# Patient Record
Sex: FEMALE | Race: OTHER | HISPANIC OR LATINO | URBAN - METROPOLITAN AREA
[De-identification: names, ages, dates, MRNs, and addresses within clinical notes are randomized per-mention and may not be internally consistent; named-entity substitution may affect disease eponyms.]

---

## 2018-02-18 ENCOUNTER — EMERGENCY (EMERGENCY)
Facility: HOSPITAL | Age: 38
LOS: 1 days | Discharge: HOME | End: 2018-02-18
Attending: EMERGENCY MEDICINE | Admitting: EMERGENCY MEDICINE
Payer: COMMERCIAL

## 2018-02-18 VITALS
OXYGEN SATURATION: 100 % | RESPIRATION RATE: 17 BRPM | SYSTOLIC BLOOD PRESSURE: 154 MMHG | TEMPERATURE: 100 F | DIASTOLIC BLOOD PRESSURE: 90 MMHG | HEART RATE: 87 BPM

## 2018-02-18 VITALS
DIASTOLIC BLOOD PRESSURE: 78 MMHG | RESPIRATION RATE: 18 BRPM | OXYGEN SATURATION: 100 % | HEART RATE: 114 BPM | TEMPERATURE: 99 F | SYSTOLIC BLOOD PRESSURE: 154 MMHG

## 2018-02-18 LAB
ALBUMIN SERPL ELPH-MCNC: 4.3 G/DL — SIGNIFICANT CHANGE UP (ref 3.3–5)
ALP SERPL-CCNC: 64 U/L — SIGNIFICANT CHANGE UP (ref 40–120)
ALT FLD-CCNC: 25 U/L — SIGNIFICANT CHANGE UP (ref 4–33)
AST SERPL-CCNC: 17 U/L — SIGNIFICANT CHANGE UP (ref 4–32)
BASOPHILS # BLD AUTO: 0.03 K/UL — SIGNIFICANT CHANGE UP (ref 0–0.2)
BASOPHILS NFR BLD AUTO: 0.4 % — SIGNIFICANT CHANGE UP (ref 0–2)
BILIRUB SERPL-MCNC: 0.2 MG/DL — SIGNIFICANT CHANGE UP (ref 0.2–1.2)
BUN SERPL-MCNC: 9 MG/DL — SIGNIFICANT CHANGE UP (ref 7–23)
CALCIUM SERPL-MCNC: 9.4 MG/DL — SIGNIFICANT CHANGE UP (ref 8.4–10.5)
CHLORIDE SERPL-SCNC: 103 MMOL/L — SIGNIFICANT CHANGE UP (ref 98–107)
CO2 SERPL-SCNC: 22 MMOL/L — SIGNIFICANT CHANGE UP (ref 22–31)
CREAT SERPL-MCNC: 0.73 MG/DL — SIGNIFICANT CHANGE UP (ref 0.5–1.3)
D DIMER BLD IA.RAPID-MCNC: < 150 NG/ML — SIGNIFICANT CHANGE UP
EOSINOPHIL # BLD AUTO: 0 K/UL — SIGNIFICANT CHANGE UP (ref 0–0.5)
EOSINOPHIL NFR BLD AUTO: 0 % — SIGNIFICANT CHANGE UP (ref 0–6)
GLUCOSE SERPL-MCNC: 107 MG/DL — HIGH (ref 70–99)
HCT VFR BLD CALC: 41.9 % — SIGNIFICANT CHANGE UP (ref 34.5–45)
HGB BLD-MCNC: 13.9 G/DL — SIGNIFICANT CHANGE UP (ref 11.5–15.5)
IMM GRANULOCYTES # BLD AUTO: 0.02 # — SIGNIFICANT CHANGE UP
IMM GRANULOCYTES NFR BLD AUTO: 0.2 % — SIGNIFICANT CHANGE UP (ref 0–1.5)
LYMPHOCYTES # BLD AUTO: 2.29 K/UL — SIGNIFICANT CHANGE UP (ref 1–3.3)
LYMPHOCYTES # BLD AUTO: 27.6 % — SIGNIFICANT CHANGE UP (ref 13–44)
MCHC RBC-ENTMCNC: 26.7 PG — LOW (ref 27–34)
MCHC RBC-ENTMCNC: 33.2 % — SIGNIFICANT CHANGE UP (ref 32–36)
MCV RBC AUTO: 80.4 FL — SIGNIFICANT CHANGE UP (ref 80–100)
MONOCYTES # BLD AUTO: 0.52 K/UL — SIGNIFICANT CHANGE UP (ref 0–0.9)
MONOCYTES NFR BLD AUTO: 6.3 % — SIGNIFICANT CHANGE UP (ref 2–14)
NEUTROPHILS # BLD AUTO: 5.43 K/UL — SIGNIFICANT CHANGE UP (ref 1.8–7.4)
NEUTROPHILS NFR BLD AUTO: 65.5 % — SIGNIFICANT CHANGE UP (ref 43–77)
NRBC # FLD: 0 — SIGNIFICANT CHANGE UP
PLATELET # BLD AUTO: 337 K/UL — SIGNIFICANT CHANGE UP (ref 150–400)
PMV BLD: 9.9 FL — SIGNIFICANT CHANGE UP (ref 7–13)
POTASSIUM SERPL-MCNC: 3.8 MMOL/L — SIGNIFICANT CHANGE UP (ref 3.5–5.3)
POTASSIUM SERPL-SCNC: 3.8 MMOL/L — SIGNIFICANT CHANGE UP (ref 3.5–5.3)
PROT SERPL-MCNC: 8.3 G/DL — SIGNIFICANT CHANGE UP (ref 6–8.3)
RBC # BLD: 5.21 M/UL — HIGH (ref 3.8–5.2)
RBC # FLD: 13.6 % — SIGNIFICANT CHANGE UP (ref 10.3–14.5)
SODIUM SERPL-SCNC: 140 MMOL/L — SIGNIFICANT CHANGE UP (ref 135–145)
TSH SERPL-MCNC: 2.07 UIU/ML — SIGNIFICANT CHANGE UP (ref 0.27–4.2)
WBC # BLD: 8.29 K/UL — SIGNIFICANT CHANGE UP (ref 3.8–10.5)
WBC # FLD AUTO: 8.29 K/UL — SIGNIFICANT CHANGE UP (ref 3.8–10.5)

## 2018-02-18 PROCEDURE — 71046 X-RAY EXAM CHEST 2 VIEWS: CPT | Mod: 26

## 2018-02-18 PROCEDURE — 99285 EMERGENCY DEPT VISIT HI MDM: CPT

## 2018-02-18 RX ORDER — LIDOCAINE 4 G/100G
10 CREAM TOPICAL ONCE
Qty: 0 | Refills: 0 | Status: COMPLETED | OUTPATIENT
Start: 2018-02-18 | End: 2018-02-18

## 2018-02-18 RX ORDER — ACETAMINOPHEN 500 MG
975 TABLET ORAL ONCE
Qty: 0 | Refills: 0 | Status: COMPLETED | OUTPATIENT
Start: 2018-02-18 | End: 2018-02-18

## 2018-02-18 RX ORDER — FAMOTIDINE 10 MG/ML
1 INJECTION INTRAVENOUS
Qty: 7 | Refills: 0 | OUTPATIENT
Start: 2018-02-18 | End: 2018-02-24

## 2018-02-18 RX ORDER — FAMOTIDINE 10 MG/ML
20 INJECTION INTRAVENOUS ONCE
Qty: 0 | Refills: 0 | Status: COMPLETED | OUTPATIENT
Start: 2018-02-18 | End: 2018-02-18

## 2018-02-18 RX ADMIN — Medication 975 MILLIGRAM(S): at 21:03

## 2018-02-18 RX ADMIN — Medication 30 MILLILITER(S): at 22:05

## 2018-02-18 RX ADMIN — LIDOCAINE 10 MILLILITER(S): 4 CREAM TOPICAL at 22:05

## 2018-02-18 RX ADMIN — FAMOTIDINE 20 MILLIGRAM(S): 10 INJECTION INTRAVENOUS at 22:05

## 2018-02-18 NOTE — ED ADULT NURSE NOTE - OBJECTIVE STATEMENT
Pt. received in room 9. Pt. is A&O x3 and ambulatory at baseline. Pt. c/o chest pain and SOB. Pt. states the pain is intermittent, and is located in the center, feels like "pressure" and worsens with exertion. Pt. also c/o intermittent nausea and SOB. At present, pt. denies chest pain. Respirations are even and unlabored on room air. Pt. has family hx of MI at 33 (uncle). Pt. denies vomiting, hx of dvt/PE, abdominal pain, dysuria. Pt. placed on cardiac monitor. EKG in chart. 20 gauge IV inserted in left ac. Labs drawn and sent. Will continue to monitor.

## 2018-02-18 NOTE — ED PROVIDER NOTE - ATTENDING CONTRIBUTION TO CARE
Dr. Singleton: I performed a face to face bedside interview with patient regarding history of present illness, review of symptoms and past medical history. I completed an independent physical exam.  I have discussed patient's plan of care with PA.   I agree with note as stated above, having amended the EMR as needed to reflect my findings.   This includes HISTORY OF PRESENT ILLNESS, HIV, PAST MEDICAL/SURGICAL/FAMILY/SOCIAL HISTORY, ALLERGIES AND HOME MEDICATIONS, REVIEW OF SYSTEMS, PHYSICAL EXAM, and any PROGRESS NOTES during the time I functioned as the attending physician for this patient. HPI above as by me. PE above as by me. DDX low risk acs, r/o pe r/o pneumo PLAN upreg, cxr, cbc, cmp, lipase, dimer, meds, reass DISPO home with cardio f/up.

## 2018-02-18 NOTE — ED PROVIDER NOTE - MEDICAL DECISION MAKING DETAILS
37 year old female with a PMHx of iron deficiency anemia, taking OCPs pw chest pain, SOB and palpitations.   Plan: cbc, cmp, ce, ekg, chest xray, ddimer, tsh

## 2018-02-18 NOTE — ED ADULT TRIAGE NOTE - CHIEF COMPLAINT QUOTE
pt presents c/o chest pain, palpitations and sob since last week, resolved and returned today. pt also c/o nausea, dry mouth and hot flashes. denies any additional symptoms. PMHX: iron deficiency, gestational dm

## 2018-02-18 NOTE — ED PROVIDER NOTE - CARE PLAN
Principal Discharge DX:	Atypical chest pain Principal Discharge DX:	Atypical chest pain  Assessment and plan of treatment:	Take Pepcid 40mg once daily for 7 days. Follow up with your primary care physician within 1 week for further evaluation - bring a copy of your results with you to your appointment. Return to the Emergency Department for any new, worsening or concerning symptoms.

## 2018-02-18 NOTE — ED PROVIDER NOTE - PLAN OF CARE
Take Pepcid 40mg once daily for 7 days. Follow up with your primary care physician within 1 week for further evaluation - bring a copy of your results with you to your appointment. Return to the Emergency Department for any new, worsening or concerning symptoms.

## 2018-02-18 NOTE — ED PROVIDER NOTE - OBJECTIVE STATEMENT
37 year old female with a PMHx of iron deficiency anemia, taking OCPs pw chest pain, SOB and palpitations. Pain is located in the center, tightness in nature, radiating down the left arm, worse with exertion, alleviated when resting, constant, associated with nausea, lightheadedness, and SOB. Pt states that she had this pain 1 week ago and it lasted for a few days - she saw a cardiologist at that time (Dr. Moulton - affiliated with [facility]) states that she had blood work and an EKG but has not received her results yet. Today pt began feeling SOB which is new. family hx of uncle with MI at 33, maternal/paternal grandparents MI. Denies f/c, cough, hx of travel, hx of immobilization, hx of tobacco use, hx of dvt/pe/clots, vomtiing, abdominal pain, dysuria, hematuria, melena, hematochezia. 37 year old female with a PMHx of iron deficiency anemia, taking OCPs pw chest pain, SOB and palpitations. Pain is located in the center, tightness in nature, radiating down the left arm, worse with exertion, alleviated when resting, constant, associated with nausea, lightheadedness, and SOB. Pt states that she had this pain 1 week ago and it lasted for a few days - she saw a cardiologist at that time (Dr. Moulton - affiliated with [facility]) states that she had blood work and an EKG but has not received her results yet. Today pt began feeling SOB which is new. family hx of uncle with MI at 33, maternal/paternal grandparents MI. Denies f/c, cough, hx of travel, hx of immobilization, hx of tobacco use, hx of dvt/pe/clots, vomtiing, abdominal pain, dysuria, hematuria, melena, hematochezia.  21:07 Mani att: 37F h/o iron def anemia, on ocp c/o intermitt cp x 1 wk. Past one week patient notes cp, midsternal, tightness, lasted 2 days. Associated lightheadedness, palpitations, nausea, 37 year old female with a PMHx of iron deficiency anemia, taking OCPs pw chest pain, SOB and palpitations. Pain is located in the center, tightness in nature, radiating down the left arm, worse with exertion, alleviated when resting, constant, associated with nausea, lightheadedness, and SOB. Pt states that she had this pain 1 week ago and it lasted for a few days - she saw a cardiologist at that time (Dr. Moulton - affiliated with [facility]) states that she had blood work and an EKG but has not received her results yet. Today pt began feeling SOB which is new. family hx of uncle with MI at 33, maternal/paternal grandparents MI. Denies f/c, cough, hx of travel, hx of immobilization, hx of tobacco use, hx of dvt/pe/clots, vomtiing, abdominal pain, dysuria, hematuria, melena, hematochezia.    21:07 Singleton att: 37F h/o iron def anemia, on ocp c/o intermitt cp x 1 wk. Past one week patient notes cp, midsternal, tightness, lasted 2 days. Associated lightheadedness, palpitations, nausea. Saw a cardiologist, nl ekg, nl bloodwork, dc from office. Today after lunch patient developed same midsternal chest tightness, rad to lue, continuous since 2pm. Assoc nausea, sob and sour taste in mouth. Has an uncle who passed at 31, neither parents nor sisters with cardiac disease. Non smoker. HEART zero PERC zero SMOKING denies. Denies pleurisy, hemoptysis, tachypnea, fever, cough, chills, rhinorrhea, sneezing.